# Patient Record
Sex: MALE | Race: WHITE | NOT HISPANIC OR LATINO | Employment: UNEMPLOYED | URBAN - METROPOLITAN AREA
[De-identification: names, ages, dates, MRNs, and addresses within clinical notes are randomized per-mention and may not be internally consistent; named-entity substitution may affect disease eponyms.]

---
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
## 2024-06-24 ENCOUNTER — APPOINTMENT (OUTPATIENT)
Dept: DERMATOLOGY | Facility: CLINIC | Age: 19
End: 2024-06-24
Payer: COMMERCIAL

## 2024-06-24 DIAGNOSIS — L72.0 EPITHELIAL INCLUSION CYST: Primary | ICD-10-CM

## 2024-06-24 DIAGNOSIS — R20.8 SKIN PAIN: ICD-10-CM

## 2024-06-24 PROCEDURE — 99203 OFFICE O/P NEW LOW 30 MIN: CPT | Performed by: DERMATOLOGY

## 2024-06-24 PROCEDURE — 10060 I&D ABSCESS SIMPLE/SINGLE: CPT | Performed by: DERMATOLOGY

## 2024-06-24 PROCEDURE — 11900 INJECT SKIN LESIONS </W 7: CPT | Performed by: DERMATOLOGY

## 2024-06-24 RX ORDER — CEPHALEXIN 500 MG/1
500 CAPSULE ORAL 2 TIMES DAILY
Qty: 20 CAPSULE | Refills: 0 | Status: SHIPPED | OUTPATIENT
Start: 2024-06-24 | End: 2024-07-04

## 2024-06-24 ASSESSMENT — DERMATOLOGY PATIENT ASSESSMENT
WHERE ARE THESE NEW OR CHANGING LESIONS LOCATED: NECK
ARE YOU AN ORGAN TRANSPLANT RECIPIENT: NO
DO YOU HAVE ANY NEW OR CHANGING LESIONS: YES
DO YOU USE SUNSCREEN: OCCASIONALLY
DO YOU USE A TANNING BED: NO

## 2024-06-24 ASSESSMENT — DERMATOLOGY QUALITY OF LIFE (QOL) ASSESSMENT
RATE HOW BOTHERED YOU ARE BY EFFECTS OF YOUR SKIN PROBLEMS ON YOUR ACTIVITIES (EG, GOING OUT, ACCOMPLISHING WHAT YOU WANT, WORK ACTIVITIES OR YOUR RELATIONSHIPS WITH OTHERS): 1
ARE THERE EXCLUSIONS OR EXCEPTIONS FOR THE QUALITY OF LIFE ASSESSMENT: NO
RATE HOW EMOTIONALLY BOTHERED YOU ARE BY YOUR SKIN PROBLEM (FOR EXAMPLE, WORRY, EMBARRASSMENT, FRUSTRATION): 2
RATE HOW BOTHERED YOU ARE BY SYMPTOMS OF YOUR SKIN PROBLEM (EG, ITCHING, STINGING BURNING, HURTING OR SKIN IRRITATION): 3
WHAT SINGLE SKIN CONDITION LISTED BELOW IS THE PATIENT ANSWERING THE QUALITY-OF-LIFE ASSESSMENT QUESTIONS ABOUT: NONE OF THE ABOVE

## 2024-06-24 ASSESSMENT — ITCH NUMERIC RATING SCALE: HOW SEVERE IS YOUR ITCHING?: 0

## 2024-06-24 ASSESSMENT — PATIENT GLOBAL ASSESSMENT (PGA): PATIENT GLOBAL ASSESSMENT: PATIENT GLOBAL ASSESSMENT:  2 - MILD

## 2024-06-24 NOTE — PROGRESS NOTES
Subjective     Karson Man is a 18 y.o. male who presents for the following: Suspicious Skin Lesion (Pt here for lesion on posterior neck. Past few days has become very painful. Lesion has been present for a while.).     Review of Systems:  No other skin or systemic complaints other than what is documented elsewhere in the note.    The following portions of the chart were reviewed this encounter and updated as appropriate:          Skin Cancer History  No skin cancer on file.      Specialty Problems    None       Objective   Well appearing patient in no apparent distress; mood and affect are within normal limits.    A focused skin examination was performed. All findings within normal limits unless otherwise noted below.    Assessment/Plan   1. Epithelial inclusion cyst  Neck - Posterior  2 cm subcutaneous, mobile nodule with a central punctum. With tenderness and erythema    Epidermal cysts are benign, encapsulated growths of unknown cause.   These lesions can become enlarged, inflamed, painful and drain.   These lesions can be removed surgically, however this procedure requires a separate appointment, local anesthesia is used and often requires both deep and superficial sutures (stitches).   Following removal the lesion will be traded for a scar.   Risks and benefits of removal include but are limited to: incomplete removal, recurrence, keloid (thickened, itchy or painful scar) formation, wound dehiscence (opening) and need to limit activity for 10-14 days following procedure.    Due to pain and irritation discussed I&d and intralesional kenalog. Intralesional kenalog (ILK) treatment may require multiple treatments and may cause skin discoloration, atrophy (thinning) of the skin.    This will not make lesions completely resolve, it will refill, however will alleviate symptoms    He is unsure if he would like to pursue removal and will consider in the future, so will not send PA at this time    Start  cephalexin 500mg 2x daily x 10 days.     Incision and drainage - Neck - Posterior    Date/Time: 6/24/2024 2:49 PM  Type: cyst    Informed consent: discussed and consent obtained  Timeout: patient name, date of birth, surgical site, and procedure verified  Procedure prep:  Patient prepped in sterile fashion  Prep type:  Chlorhexidine  Anesthesia:  the lesion was anesthetized in a standard fashion  Anesthetic:  1% lidocaine w/ epinephrine 1-100,000 local infiltration  Incision & Drainage Type:  CystScalpel size: 11  Incision type: single straight  Incision depth: dermal  Complexity: simple  Drainage characteristics: blood and cyst contents.  Drainage amount: moderate  Wound treatment: wound left open  Hemostasis achieved with:  pressure  Outcome:  patient tolerated procedure well with no complications  Post-procedure details:  sterile dressing applied and wound care instructions given  Dressing type: petrolatum and pressure dressing       Staff Communication: Dermatology Local Anesthesia: 1 % Lidocaine / Epinephrine - Amount:1cc - Neck - Posterior    cephalexin (Keflex) 500 mg capsule - Neck - Posterior  Take 1 capsule (500 mg) by mouth 2 times a day for 10 days.    Intralesional injection - Neck - Posterior  Intralesional Injection:   Date/Time: 6/24/2024 3:00 PM    Consent:     Consent obtained:  Verbal    Consent given by:  Patient    Risks discussed:  Pain and bleeding    Alternatives discussed:  No treatment and observation  Pre-Procedure Details:     Prep Type:  Isopropyl alcohol  Procedure Details:   Injection:  Triamcinolone  Outcome: patient tolerated procedure well  Comments:  A total of 1 lesions were injected.  0.5 mL of 10mg/ml were injected.    triamcinolone acetonide (Kenalog) injection 10 mg - Neck - Posterior        Follow up as needed. Wound care instructions, bandages provided.

## 2024-06-28 ENCOUNTER — TELEPHONE (OUTPATIENT)
Dept: DERMATOLOGY | Facility: CLINIC | Age: 19
End: 2024-06-28
Payer: COMMERCIAL

## 2024-06-28 DIAGNOSIS — L72.0 EPITHELIAL INCLUSION CYST: Primary | ICD-10-CM

## 2024-06-28 DIAGNOSIS — R20.8 SKIN PAIN: ICD-10-CM

## 2024-06-28 NOTE — TELEPHONE ENCOUNTER
Pt called [facility] staff in [place] stating he wanted to proceed with cyst removal. I know at [facility] pt was unsure. I told them he would send PA sent prior to scheduling. I do not see a picture in chart. He may need to send one via [email or web address]. I asked PA dept.

## 2024-07-08 ENCOUNTER — TELEPHONE (OUTPATIENT)
Dept: DERMATOLOGY | Facility: CLINIC | Age: 19
End: 2024-07-08
Payer: COMMERCIAL